# Patient Record
Sex: FEMALE | Race: WHITE | NOT HISPANIC OR LATINO | URBAN - METROPOLITAN AREA
[De-identification: names, ages, dates, MRNs, and addresses within clinical notes are randomized per-mention and may not be internally consistent; named-entity substitution may affect disease eponyms.]

---

## 2022-12-09 ENCOUNTER — OUTPATIENT (OUTPATIENT)
Dept: INPATIENT UNIT | Facility: HOSPITAL | Age: 22
LOS: 1 days | Discharge: HOME | End: 2022-12-09

## 2022-12-09 ENCOUNTER — EMERGENCY (EMERGENCY)
Facility: HOSPITAL | Age: 22
LOS: 1 days | Discharge: STILL ADMITTED OR ADMINISTRATIVE | End: 2022-12-09
Attending: EMERGENCY MEDICINE | Admitting: EMERGENCY MEDICINE
Payer: SELF-PAY

## 2022-12-09 VITALS
OXYGEN SATURATION: 100 % | DIASTOLIC BLOOD PRESSURE: 58 MMHG | TEMPERATURE: 98 F | HEART RATE: 70 BPM | SYSTOLIC BLOOD PRESSURE: 101 MMHG | RESPIRATION RATE: 16 BRPM

## 2022-12-09 VITALS
TEMPERATURE: 99 F | SYSTOLIC BLOOD PRESSURE: 109 MMHG | DIASTOLIC BLOOD PRESSURE: 59 MMHG | RESPIRATION RATE: 17 BRPM | HEART RATE: 78 BPM

## 2022-12-09 DIAGNOSIS — O26.899 OTHER SPECIFIED PREGNANCY RELATED CONDITIONS, UNSPECIFIED TRIMESTER: ICD-10-CM

## 2022-12-09 PROCEDURE — 99285 EMERGENCY DEPT VISIT HI MDM: CPT

## 2022-12-09 NOTE — ED PROVIDER NOTE - ATTENDING CONTRIBUTION TO CARE
Agree with resident note  22-year-old female G4, P3 at approximately 9 months by last menstrual period presents with lower abdominal pain.  Patient is Vietnamese speaking,  used.  Patient states last menstrual period sometime in March.  Since then has not received any prenatal care.  States felt no indication to see an OB/GYN.  States pregnancy has progressed normally as her last 3 have.  Denies vaginal bleeding, uterine contractions.  Physical exam  Gen: pt well appearing in no respiratory distress  vital signs stable  NCAT  Lungs: CTAB/L  Cardiac: s1 s2 no m/r/g  abdomen: gravid uterus  ext: no edema  Neuro: CNs intact 5/5 motor UE and LE; sensation intact; gait stable  skin: no rash  Bedside ultrasound; shows mature fetus likely corresponding to dates  Impression; likely 9-month pregnant patient with abdominal pain; will need to go to labor and delivery urgently, not actively bleeding or having contractions.  Called labor and delivery spoke with midlevel provider who instructed to send patient.  Will dispo as a AOU to L&D.

## 2022-12-09 NOTE — OB RN TRIAGE NOTE - FALL HARM RISK - UNIVERSAL INTERVENTIONS
Bed in lowest position, wheels locked, appropriate side rails in place/Call bell, personal items and telephone in reach/Instruct patient to call for assistance before getting out of bed or chair/Non-slip footwear when patient is out of bed/[place] to call system/Physically safe environment - no spills, clutter or unnecessary equipment/Purposeful Proactive Rounding/Room/bathroom lighting operational, light cord in reach

## 2022-12-09 NOTE — ED ADULT NURSE NOTE - NSIMPLEMENTINTERV_GEN_ALL_ED
Implemented All Universal Safety Interventions:  [place] to call system. Call bell, personal items and telephone within reach. Instruct patient to call for assistance. Room bathroom lighting operational. Non-slip footwear when patient is off stretcher. Physically safe environment: no spills, clutter or unnecessary equipment. Stretcher in lowest position, wheels locked, appropriate side rails in place.

## 2022-12-09 NOTE — ED PROVIDER NOTE - CLINICAL SUMMARY MEDICAL DECISION MAKING FREE TEXT BOX
22-year-old female G4, P3 presenting with a chief complaint lower abdominal pain. States that her last menstrual period was in February and tested positive in March. Patient denies- fevers chills chest pain shortness of breath nausea vomiting.. Vitals - WNL. Physical exam- Gravid abdomen, POCUS large fetus with fetal HR appreciated. Patient to stable and to be transferred to L&D.

## 2022-12-09 NOTE — OB RN TRIAGE NOTE - NS_TRIAGEADDITIONAL COMMENTS_OBGYN_ALL_OB_FT
1910:  received pt waiting in the lobby of labor and delivery, no available triage rooms at the time. 1940: pt c/o that she was unable to wait any longer for a room, pt left without being seen by a provider. pt had no prenatal care this pregnancy, edc unknown. pt was notified of risk, pt insisted on leaving, providers made aware, maría elena np, teodoro moeller,

## 2022-12-09 NOTE — ED ADULT NURSE NOTE - OBJECTIVE STATEMENT
Pt presents to ED ambulatory with steady gait c/o abdominal pain x a few days, worse at night. States she is pregnant but unsure how far along and unsure of lmp. denies nausea, vomiting, diarrhea or other complaints.

## 2022-12-09 NOTE — ED PROVIDER NOTE - PHYSICAL EXAMINATION
GENERAL: Awake, alert, NAD  HEENT: NC/AT, moist mucous membranes, PERRL, EOMI  LUNGS: CTAB, no wheezes or crackles   CARDIAC: RRR, no m/r/g  ABDOMEN: Gravid abdomen, no TTP.   BACK: No midline spinal tenderness, no CVA tenderness  EXT: No edema, no calf tenderness, 2+ DP pulses bilaterally, no deformities.  NEURO: A&Ox3. Moving all extremities.  SKIN: Warm and dry. No rash.  PSYCH: Normal affect.

## 2022-12-09 NOTE — ED PROVIDER NOTE - DISPOSITION TYPE
AOU
no back pain/no chest pain/no shortness of breath/no vomiting/no nausea/no chills/no fever/no abd pain, leg swelling, diarrhea, HA

## 2022-12-09 NOTE — ED PROVIDER NOTE - OBJECTIVE STATEMENT
22-year-old female G4, P3 presenting with a chief complaint lower abdominal pain. Patient is primarily Greenlandic speaking and translation services were used. States that she is pregnant but is unsure of how far along she is.  States that her last menstrual period was in February and tested positive in March. States she has not been to a physician since March because she felt as though everything was progressing normally.  Patient denies any bleeding or vaginal discharge.  States that the pain feels like pressure. Bedside U/S done showing large fetus with heartbeat. Patient is approximately 9 months pregnant.   Patient denies any fevers chills chest pain shortness of breath nausea vomiting.  ROS positive for abdominal pain.

## 2022-12-09 NOTE — OB RN TRIAGE NOTE - NS_OBGYNHISTORY_OBGYN_ALL_OB_FT
17 FT  in [facility] male 3.5kg  18 FT  in [facility] male 3.5kg  22 FT  in [place] male 3.5kg

## 2022-12-12 ENCOUNTER — OUTPATIENT (OUTPATIENT)
Dept: INPATIENT UNIT | Facility: HOSPITAL | Age: 22
LOS: 1 days | Discharge: HOME | End: 2022-12-12

## 2022-12-12 ENCOUNTER — EMERGENCY (EMERGENCY)
Facility: HOSPITAL | Age: 22
LOS: 1 days | Discharge: STILL ADMITTED OR ADMINISTRATIVE | End: 2022-12-12
Admitting: EMERGENCY MEDICINE

## 2022-12-12 VITALS
SYSTOLIC BLOOD PRESSURE: 113 MMHG | RESPIRATION RATE: 18 BRPM | HEART RATE: 69 BPM | DIASTOLIC BLOOD PRESSURE: 66 MMHG | OXYGEN SATURATION: 100 % | TEMPERATURE: 98 F

## 2022-12-12 VITALS
HEART RATE: 82 BPM | OXYGEN SATURATION: 100 % | SYSTOLIC BLOOD PRESSURE: 110 MMHG | DIASTOLIC BLOOD PRESSURE: 54 MMHG | TEMPERATURE: 99 F | RESPIRATION RATE: 16 BRPM

## 2022-12-12 VITALS — DIASTOLIC BLOOD PRESSURE: 67 MMHG | SYSTOLIC BLOOD PRESSURE: 117 MMHG | HEART RATE: 75 BPM

## 2022-12-12 DIAGNOSIS — O26.899 OTHER SPECIFIED PREGNANCY RELATED CONDITIONS, UNSPECIFIED TRIMESTER: ICD-10-CM

## 2022-12-12 LAB
APPEARANCE UR: ABNORMAL
BACTERIA # UR AUTO: ABNORMAL
BASOPHILS # BLD AUTO: 0.03 K/UL — SIGNIFICANT CHANGE UP (ref 0–0.2)
BASOPHILS NFR BLD AUTO: 0.3 % — SIGNIFICANT CHANGE UP (ref 0–2)
BILIRUB UR-MCNC: NEGATIVE — SIGNIFICANT CHANGE UP
BLD GP AB SCN SERPL QL: NEGATIVE — SIGNIFICANT CHANGE UP
COLOR SPEC: YELLOW — SIGNIFICANT CHANGE UP
DIFF PNL FLD: ABNORMAL
EOSINOPHIL # BLD AUTO: 0.15 K/UL — SIGNIFICANT CHANGE UP (ref 0–0.5)
EOSINOPHIL NFR BLD AUTO: 1.6 % — SIGNIFICANT CHANGE UP (ref 0–6)
EPI CELLS # UR: 5 /HPF — SIGNIFICANT CHANGE UP (ref 0–5)
GLUCOSE UR QL: NEGATIVE — SIGNIFICANT CHANGE UP
HBV SURFACE AG SERPL QL IA: SIGNIFICANT CHANGE UP
HCT VFR BLD CALC: 33.9 % — LOW (ref 34.5–45)
HGB BLD-MCNC: 10.5 G/DL — LOW (ref 11.5–15.5)
HIV 1+2 AB+HIV1 P24 AG SERPL QL IA: SIGNIFICANT CHANGE UP
HYALINE CASTS # UR AUTO: 0 /LPF — SIGNIFICANT CHANGE UP (ref 0–7)
IANC: 5.89 K/UL — SIGNIFICANT CHANGE UP (ref 1.8–7.4)
IMM GRANULOCYTES NFR BLD AUTO: 0.3 % — SIGNIFICANT CHANGE UP (ref 0–0.9)
KETONES UR-MCNC: NEGATIVE — SIGNIFICANT CHANGE UP
LEUKOCYTE ESTERASE UR-ACNC: ABNORMAL
LYMPHOCYTES # BLD AUTO: 2.34 K/UL — SIGNIFICANT CHANGE UP (ref 1–3.3)
LYMPHOCYTES # BLD AUTO: 25.6 % — SIGNIFICANT CHANGE UP (ref 13–44)
MCHC RBC-ENTMCNC: 25.5 PG — LOW (ref 27–34)
MCHC RBC-ENTMCNC: 31 GM/DL — LOW (ref 32–36)
MCV RBC AUTO: 82.3 FL — SIGNIFICANT CHANGE UP (ref 80–100)
MONOCYTES # BLD AUTO: 0.71 K/UL — SIGNIFICANT CHANGE UP (ref 0–0.9)
MONOCYTES NFR BLD AUTO: 7.8 % — SIGNIFICANT CHANGE UP (ref 2–14)
NEUTROPHILS # BLD AUTO: 5.89 K/UL — SIGNIFICANT CHANGE UP (ref 1.8–7.4)
NEUTROPHILS NFR BLD AUTO: 64.4 % — SIGNIFICANT CHANGE UP (ref 43–77)
NITRITE UR-MCNC: NEGATIVE — SIGNIFICANT CHANGE UP
NRBC # BLD: 0 /100 WBCS — SIGNIFICANT CHANGE UP (ref 0–0)
NRBC # FLD: 0 K/UL — SIGNIFICANT CHANGE UP (ref 0–0)
PH UR: 6.5 — SIGNIFICANT CHANGE UP (ref 5–8)
PLATELET # BLD AUTO: 268 K/UL — SIGNIFICANT CHANGE UP (ref 150–400)
PROT UR-MCNC: ABNORMAL
RBC # BLD: 4.12 M/UL — SIGNIFICANT CHANGE UP (ref 3.8–5.2)
RBC # FLD: 13.2 % — SIGNIFICANT CHANGE UP (ref 10.3–14.5)
RBC CASTS # UR COMP ASSIST: 2 /HPF — SIGNIFICANT CHANGE UP (ref 0–4)
RH IG SCN BLD-IMP: POSITIVE — SIGNIFICANT CHANGE UP
RUBV IGG SER-ACNC: 4.6 INDEX — SIGNIFICANT CHANGE UP
RUBV IGG SER-IMP: POSITIVE — SIGNIFICANT CHANGE UP
SP GR SPEC: 1.02 — SIGNIFICANT CHANGE UP (ref 1.01–1.05)
T PALLIDUM AB TITR SER: NEGATIVE — SIGNIFICANT CHANGE UP
UROBILINOGEN FLD QL: SIGNIFICANT CHANGE UP
WBC # BLD: 9.15 K/UL — SIGNIFICANT CHANGE UP (ref 3.8–10.5)
WBC # FLD AUTO: 9.15 K/UL — SIGNIFICANT CHANGE UP (ref 3.8–10.5)
WBC UR QL: 246 /HPF — HIGH (ref 0–5)

## 2022-12-12 PROCEDURE — 76818 FETAL BIOPHYS PROFILE W/NST: CPT | Mod: 26

## 2022-12-12 PROCEDURE — 99217: CPT

## 2022-12-12 PROCEDURE — L9996: CPT

## 2022-12-12 RX ORDER — CEPHALEXIN 500 MG
1 CAPSULE ORAL
Qty: 14 | Refills: 0
Start: 2022-12-12 | End: 2022-12-18

## 2022-12-12 NOTE — OB PROVIDER TRIAGE NOTE - PLAN OF CARE
No evidence of PTL   VE 0/0/-3 with irregular contractions- patient denies contractions and cramping.   Fetal status reassuring  Reports that she has had abdominal pain on and off throughout pregnancy and it feels better.   Patient d/w Dr. Hernandez and Dr. Overton, tracing reviewed   Keflex 500mg bid x7days ordered for probable uti   patient to schedule appointment with clinic.   prenatal labs and GBS sent   Patient to be discharged home No evidence of PTL   VE 0/0/-3 with irregular contractions- patient denies feeling contractions or cramping.   Fetal status reassuring  reports her abdominal pain is better.   Plan d/w Dr. Hernandez and Dr. Overton, tracing reviewed   Keflex 500mg bid x7days ordered for probable uti   patient to schedule appointment with clinic phone number given and email sent.   prenatal labs and GBS sent   Patient to be discharged home.    Discharge instructions given using  ID [id number] Kindra

## 2022-12-12 NOTE — OB RN TRIAGE NOTE - NS_OBGYNHISTORY_OBGYN_ALL_OB_FT
17 FT  in [facility] male 3.5kg  18 FT  in [facility] male 3.5kg  22 FT  in [place] male 3.5kg

## 2022-12-12 NOTE — OB RN TRIAGE NOTE - FALL HARM RISK - UNIVERSAL INTERVENTIONS
Bed in lowest position, wheels locked, appropriate side rails in place/Call bell, personal items and telephone in reach/Instruct patient to call for assistance before getting out of bed or chair/Non-slip footwear when patient is out of bed/[place] to call system/Physically safe environment - no spills, clutter or unnecessary equipment/Purposeful Proactive Rounding/Room/bathroom lighting operational, light cord in reach

## 2022-12-12 NOTE — OB PROVIDER TRIAGE NOTE - NSOBPROVIDERNOTE_OBGYN_ALL_OB_FT
Approx. EDC 2/19/2023   reactive NST with irregular contractions on toco. Patient denies feeling contractions at this time.   urine culture sent   awaiting plan of care.  will continue to monitor Approx. EDC 2/19/2023   reactive NST with irregular contractions on toco. Patient denies feeling contractions at this time.   urine culture sent   awaiting plan of care.  will continue to monitor    0330: Plan d/w Dr. Hernandez and Dr. Overton   patient to have GBS and Prenatal labs sent  Patient to start Keflex 500mg BID x7 days for probable uti   patient states her abdominal pain feels better.   To make appointment in clinic- email to be sent Approx. EDC 2/19/2023   reactive NST with irregular contractions on toco. Patient denies feeling contractions at this time.   urine culture sent   awaiting plan of care.  will continue to monitor    0330: Plan d/w Dr. Hernandez and Dr. Overton   patient to have GBS and Prenatal labs sent  Patient to start Keflex 500mg BID x7 days for probable uti   patient states her abdominal pain feels better    0350: Tracing reviewed with Dr. Hernandez and plan discussed  patient approved to be taken off continuous efm at this time   patient to be discharged home.

## 2022-12-12 NOTE — OB PROVIDER TRIAGE NOTE - HISTORY OF PRESENT ILLNESS
22 year old  unaware of EDC or gestational age, reports not having prenatal care presents with complaints of sharp lower abdominal pain for the past few days. Patient denies contractions, leaking of fluid, vaginal bleeding, reports good fetal movement. Patient states that she is from [facility] then was living in [place] where she delivered her last baby in 2022. Patient states she never had a period after her delivery and was unsure when she got pregnant but found out in sept. Patient says that she would go to the hospital for check ups but did not establish care at an OB office. Patient c/o dysuria denies fevers, chills, nausea, vomiting, diarrhea. Reports being treated for a UTI in September.   PMH: Denies   PSH: Denies   Allergies: NKDA  Meds: denies   Denies hx of anxiety, depression, pp depression  denies use of etoh, drugs, tobacco during pregnancy  22 year old  unaware of EDC or gestational age, reports not having prenatal care presents with complaints of sharp lower abdominal pain for the past few days. Patient denies contractions, leaking of fluid, vaginal bleeding, reports good fetal movement. Patient states that she is from [facility] then was living in [place] where she delivered her last baby in 2022. Patient states she never had a period after her delivery and was unsure when she got pregnant but found out in sept. Patient says that she would go to the hospital for check ups but did not establish care at an OB office. Patient c/o dysuria but denies fevers, chills, nausea, vomiting, diarrhea. Reports being treated for a UTI in September.   PMH: Denies   PSH: Denies   Allergies: NKDA  Meds: denies   Denies hx of anxiety, depression, pp depression  denies use of etoh, drugs, tobacco during pregnancy  22 year old  unaware of EDC or gestational age, reports not having prenatal care presents with complaints of sharp lower abdominal pain on and off throughout pregnancy. Patient denies contractions, leaking of fluid, vaginal bleeding, reports good fetal movement. Patient states that she is from [facility] then was living in [place] where she delivered her last baby in 2022. Patient states she never had a period after her delivery and was unsure when she got pregnant but found out in sept. Patient says that she would go to the hospital for check ups but did not establish care at an OB office. Patient c/o dysuria but denies fevers, chills, nausea, vomiting, diarrhea. Reports being treated for a UTI in September.   PMH: Denies   PSH: Denies   Allergies: NKDA  Meds: denies   Denies hx of anxiety, depression, pp depression  denies use of etoh, drugs, tobacco during pregnancy

## 2022-12-12 NOTE — OB PROVIDER TRIAGE NOTE - ADDITIONAL INSTRUCTIONS
Return to hospital for contractions, leaking of fluid, vaginal bleeding, decreased fetal movement.  Fetal kick counts reviewed  Return to hospital for worsening abdominal pain.  Start Keflex 500mg 1 capsule twice daily x7 days   Schedule appointment with [facility] clinic number [phone]

## 2022-12-12 NOTE — OB PROVIDER TRIAGE NOTE - NS_OBGYNHISTORY_OBGYN_ALL_OB_FT
AP course complicated by no prenatal care   OB: 17 FT  in [facility] male 3.5kg         18 FT  in [facility] male 3.5kg        22 FT  in [place] male 3.5kg  GYN: Yenifer

## 2022-12-12 NOTE — ED ADULT TRIAGE NOTE - CHIEF COMPLAINT QUOTE
Pt approx. 9 months pregnant, unknown MIKHAIL, does not have an OB. . C/o persistent lower abd pain x few days. Was seen here  for same complaint, was seen in L&D and d/c. Denies water breaking or vag bleed.     Per MD Brown pt to be seen in L&D

## 2022-12-12 NOTE — OB PROVIDER TRIAGE NOTE - NSHPLABSRESULTS_GEN_ALL_CORE
Urinalysis Basic - ( 12 Dec 2022 02:10 )    Color: Yellow / Appearance: Slightly Turbid / S.020 / pH: x  Gluc: x / Ketone: Negative  / Bili: Negative / Urobili: <2 mg/dL   Blood: x / Protein: 30 mg/dL / Nitrite: Negative   Leuk Esterase: Large / RBC: 2 /HPF /  /HPF   Sq Epi: x / Non Sq Epi: 5 /HPF / Bacteria: Many

## 2022-12-12 NOTE — ED ADULT TRIAGE NOTE - NS ED NURSE NOTE DISPO AOU DETAILS FT
Pt brought to L&D via wheelchair by ED yuli Denton. Pt A&Ox4, ambulatory at baseline. Breathing even and unlabored. NAD

## 2022-12-12 NOTE — OB PROVIDER TRIAGE NOTE - NSHPPHYSICALEXAM_GEN_ALL_CORE
Vital Signs Last 24 Hrs  T(C): 37.0 (12 Dec 2022 01:48), Max: 37 (12 Dec 2022 00:54)  T(F): 98.6 (12 Dec 2022 01:48), Max: 98.6 (12 Dec 2022 00:54)  HR: 77 (12 Dec 2022 02:51) (64 - 82)  BP: 103/55 (12 Dec 2022 02:51) (103/55 - 115/66)  BP(mean): --  RR: 16 (12 Dec 2022 00:54) (16 - 18)  SpO2: 100% (12 Dec 2022 00:54) (100% - 100%)    Assessment reveals VSS  Abdomen soft, NT, gravid   mod. variability with accels no decels, irregular contractions on toco   Transabdominal Ultrasound- bpp 8/8, CHARLENE 10.53, posterior placenta, cephalic, EFW 2283   Vaginal Exam- 0/0/-3  A&Ox3  Lungs- clear bilateral  Heart- normal rate and rhythm Vital Signs Last 24 Hrs  T(C): 37.0 (12 Dec 2022 01:48), Max: 37 (12 Dec 2022 00:54)  T(F): 98.6 (12 Dec 2022 01:48), Max: 98.6 (12 Dec 2022 00:54)  HR: 77 (12 Dec 2022 02:51) (64 - 82)  BP: 103/55 (12 Dec 2022 02:51) (103/55 - 115/66)  BP(mean): --  RR: 16 (12 Dec 2022 00:54) (16 - 18)  SpO2: 100% (12 Dec 2022 00:54) (100% - 100%)    Assessment reveals VSS  Abdomen soft, NT, gravid   mod. variability with accels no decels, irregular contractions on toco   Transabdominal Ultrasound- bpp 8/8, CHARLENE 10.53, posterior placenta, cephalic, EFW 2283g   Vaginal Exam- 0/0/-3  A&Ox3  Lungs- clear bilateral  Heart- normal rate and rhythm  No CVA tenderness noted on palpation

## 2022-12-13 PROBLEM — Z00.00 ENCOUNTER FOR PREVENTIVE HEALTH EXAMINATION: Status: ACTIVE | Noted: 2022-12-13

## 2022-12-13 LAB
CULTURE RESULTS: SIGNIFICANT CHANGE UP
SPECIMEN SOURCE: SIGNIFICANT CHANGE UP

## 2022-12-14 ENCOUNTER — APPOINTMENT (OUTPATIENT)
Dept: OBGYN | Facility: HOSPITAL | Age: 22
End: 2022-12-14

## 2022-12-14 LAB
CULTURE RESULTS: SIGNIFICANT CHANGE UP
SPECIMEN SOURCE: SIGNIFICANT CHANGE UP

## 2022-12-20 DIAGNOSIS — Z03.71 ENCOUNTER FOR SUSPECTED PROBLEM WITH AMNIOTIC CAVITY AND MEMBRANE RULED OUT: ICD-10-CM

## 2022-12-20 DIAGNOSIS — Z02.71 ENCOUNTER FOR DISABILITY DETERMINATION: ICD-10-CM

## 2022-12-22 DIAGNOSIS — O26.893 OTHER SPECIFIED PREGNANCY RELATED CONDITIONS, THIRD TRIMESTER: ICD-10-CM

## 2022-12-22 DIAGNOSIS — O09.33 SUPERVISION OF PREGNANCY WITH INSUFFICIENT ANTENATAL CARE, THIRD TRIMESTER: ICD-10-CM

## 2022-12-22 DIAGNOSIS — Z87.440 PERSONAL HISTORY OF URINARY (TRACT) INFECTIONS: ICD-10-CM

## 2022-12-22 DIAGNOSIS — Z3A.33 33 WEEKS GESTATION OF PREGNANCY: ICD-10-CM

## 2022-12-22 DIAGNOSIS — R10.30 LOWER ABDOMINAL PAIN, UNSPECIFIED: ICD-10-CM

## 2023-01-11 ENCOUNTER — EMERGENCY (EMERGENCY)
Facility: HOSPITAL | Age: 23
LOS: 1 days | Discharge: STILL ADMITTED OR ADMINISTRATIVE | End: 2023-01-11
Admitting: EMERGENCY MEDICINE
Payer: SELF-PAY

## 2023-01-11 ENCOUNTER — INPATIENT (INPATIENT)
Facility: HOSPITAL | Age: 23
LOS: 1 days | Discharge: HOME | End: 2023-01-13
Attending: SPECIALIST | Admitting: SPECIALIST
Payer: SELF-PAY

## 2023-01-11 VITALS — HEART RATE: 90 BPM | DIASTOLIC BLOOD PRESSURE: 72 MMHG | SYSTOLIC BLOOD PRESSURE: 119 MMHG

## 2023-01-11 VITALS
OXYGEN SATURATION: 100 % | RESPIRATION RATE: 16 BRPM | HEART RATE: 88 BPM | DIASTOLIC BLOOD PRESSURE: 64 MMHG | TEMPERATURE: 98 F | SYSTOLIC BLOOD PRESSURE: 117 MMHG

## 2023-01-11 DIAGNOSIS — O26.899 OTHER SPECIFIED PREGNANCY RELATED CONDITIONS, UNSPECIFIED TRIMESTER: ICD-10-CM

## 2023-01-11 DIAGNOSIS — O42.90 PREMATURE RUPTURE OF MEMBRANES, UNSPECIFIED AS TO LENGTH OF TIME BETWEEN RUPTURE AND ONSET OF LABOR, UNSPECIFIED WEEKS OF GESTATION: ICD-10-CM

## 2023-01-11 DIAGNOSIS — O42.10 PREMATURE RUPTURE OF MEMBRANES, ONSET OF LABOR MORE THAN 24 HOURS FOLLOWING RUPTURE, UNSPECIFIED WEEKS OF GESTATION: ICD-10-CM

## 2023-01-11 LAB
BASOPHILS # BLD AUTO: 0.01 K/UL — SIGNIFICANT CHANGE UP (ref 0–0.2)
BASOPHILS NFR BLD AUTO: 0.1 % — SIGNIFICANT CHANGE UP (ref 0–2)
BLD GP AB SCN SERPL QL: NEGATIVE — SIGNIFICANT CHANGE UP
COVID-19 SPIKE DOMAIN AB INTERP: POSITIVE
COVID-19 SPIKE DOMAIN ANTIBODY RESULT: >250 U/ML — HIGH
EOSINOPHIL # BLD AUTO: 0.11 K/UL — SIGNIFICANT CHANGE UP (ref 0–0.5)
EOSINOPHIL NFR BLD AUTO: 0.9 % — SIGNIFICANT CHANGE UP (ref 0–6)
HCT VFR BLD CALC: 33.2 % — LOW (ref 34.5–45)
HGB BLD-MCNC: 10.5 G/DL — LOW (ref 11.5–15.5)
IANC: 8.15 K/UL — HIGH (ref 1.8–7.4)
IMM GRANULOCYTES NFR BLD AUTO: 0.3 % — SIGNIFICANT CHANGE UP (ref 0–0.9)
LYMPHOCYTES # BLD AUTO: 2.67 K/UL — SIGNIFICANT CHANGE UP (ref 1–3.3)
LYMPHOCYTES # BLD AUTO: 22.6 % — SIGNIFICANT CHANGE UP (ref 13–44)
MCHC RBC-ENTMCNC: 25.2 PG — LOW (ref 27–34)
MCHC RBC-ENTMCNC: 31.6 GM/DL — LOW (ref 32–36)
MCV RBC AUTO: 79.6 FL — LOW (ref 80–100)
MONOCYTES # BLD AUTO: 0.82 K/UL — SIGNIFICANT CHANGE UP (ref 0–0.9)
MONOCYTES NFR BLD AUTO: 6.9 % — SIGNIFICANT CHANGE UP (ref 2–14)
NEUTROPHILS # BLD AUTO: 8.15 K/UL — HIGH (ref 1.8–7.4)
NEUTROPHILS NFR BLD AUTO: 69.2 % — SIGNIFICANT CHANGE UP (ref 43–77)
NRBC # BLD: 0 /100 WBCS — SIGNIFICANT CHANGE UP (ref 0–0)
NRBC # FLD: 0 K/UL — SIGNIFICANT CHANGE UP (ref 0–0)
PLATELET # BLD AUTO: 230 K/UL — SIGNIFICANT CHANGE UP (ref 150–400)
RBC # BLD: 4.17 M/UL — SIGNIFICANT CHANGE UP (ref 3.8–5.2)
RBC # FLD: 14.3 % — SIGNIFICANT CHANGE UP (ref 10.3–14.5)
RH IG SCN BLD-IMP: POSITIVE — SIGNIFICANT CHANGE UP
SARS-COV-2 IGG+IGM SERPL QL IA: >250 U/ML — HIGH
SARS-COV-2 IGG+IGM SERPL QL IA: POSITIVE
SARS-COV-2 RNA SPEC QL NAA+PROBE: SIGNIFICANT CHANGE UP
T PALLIDUM AB TITR SER: NEGATIVE — SIGNIFICANT CHANGE UP
WBC # BLD: 11.8 K/UL — HIGH (ref 3.8–10.5)
WBC # FLD AUTO: 11.8 K/UL — HIGH (ref 3.8–10.5)

## 2023-01-11 PROCEDURE — L9996: CPT

## 2023-01-11 PROCEDURE — 59409 OBSTETRICAL CARE: CPT | Mod: AS,U8

## 2023-01-11 RX ORDER — AER TRAVELER 0.5 G/1
1 SOLUTION RECTAL; TOPICAL EVERY 4 HOURS
Refills: 0 | Status: DISCONTINUED | OUTPATIENT
Start: 2023-01-11 | End: 2023-01-13

## 2023-01-11 RX ORDER — OXYTOCIN 10 UNIT/ML
333.33 VIAL (ML) INJECTION
Qty: 20 | Refills: 0 | Status: DISCONTINUED | OUTPATIENT
Start: 2023-01-11 | End: 2023-01-11

## 2023-01-11 RX ORDER — BENZOCAINE 10 %
1 GEL (GRAM) MUCOUS MEMBRANE EVERY 6 HOURS
Refills: 0 | Status: DISCONTINUED | OUTPATIENT
Start: 2023-01-11 | End: 2023-01-13

## 2023-01-11 RX ORDER — DIPHENHYDRAMINE HCL 50 MG
25 CAPSULE ORAL EVERY 6 HOURS
Refills: 0 | Status: DISCONTINUED | OUTPATIENT
Start: 2023-01-11 | End: 2023-01-13

## 2023-01-11 RX ORDER — PRAMOXINE HYDROCHLORIDE 150 MG/15G
1 AEROSOL, FOAM RECTAL EVERY 4 HOURS
Refills: 0 | Status: DISCONTINUED | OUTPATIENT
Start: 2023-01-11 | End: 2023-01-13

## 2023-01-11 RX ORDER — SODIUM CHLORIDE 9 MG/ML
3 INJECTION INTRAMUSCULAR; INTRAVENOUS; SUBCUTANEOUS EVERY 8 HOURS
Refills: 0 | Status: DISCONTINUED | OUTPATIENT
Start: 2023-01-11 | End: 2023-01-13

## 2023-01-11 RX ORDER — LANOLIN
1 OINTMENT (GRAM) TOPICAL EVERY 6 HOURS
Refills: 0 | Status: DISCONTINUED | OUTPATIENT
Start: 2023-01-11 | End: 2023-01-13

## 2023-01-11 RX ORDER — ACETAMINOPHEN 500 MG
975 TABLET ORAL
Refills: 0 | Status: DISCONTINUED | OUTPATIENT
Start: 2023-01-11 | End: 2023-01-13

## 2023-01-11 RX ORDER — KETOROLAC TROMETHAMINE 30 MG/ML
30 SYRINGE (ML) INJECTION ONCE
Refills: 0 | Status: DISCONTINUED | OUTPATIENT
Start: 2023-01-11 | End: 2023-01-11

## 2023-01-11 RX ORDER — MAGNESIUM HYDROXIDE 400 MG/1
30 TABLET, CHEWABLE ORAL
Refills: 0 | Status: DISCONTINUED | OUTPATIENT
Start: 2023-01-11 | End: 2023-01-13

## 2023-01-11 RX ORDER — DIBUCAINE 1 %
1 OINTMENT (GRAM) RECTAL EVERY 6 HOURS
Refills: 0 | Status: DISCONTINUED | OUTPATIENT
Start: 2023-01-11 | End: 2023-01-13

## 2023-01-11 RX ORDER — SIMETHICONE 80 MG/1
80 TABLET, CHEWABLE ORAL EVERY 4 HOURS
Refills: 0 | Status: DISCONTINUED | OUTPATIENT
Start: 2023-01-11 | End: 2023-01-13

## 2023-01-11 RX ORDER — AMPICILLIN TRIHYDRATE 250 MG
1 CAPSULE ORAL EVERY 4 HOURS
Refills: 0 | Status: DISCONTINUED | OUTPATIENT
Start: 2023-01-11 | End: 2023-01-11

## 2023-01-11 RX ORDER — OXYTOCIN 10 UNIT/ML
41.67 VIAL (ML) INJECTION
Qty: 20 | Refills: 0 | Status: DISCONTINUED | OUTPATIENT
Start: 2023-01-11 | End: 2023-01-12

## 2023-01-11 RX ORDER — IBUPROFEN 200 MG
600 TABLET ORAL EVERY 6 HOURS
Refills: 0 | Status: COMPLETED | OUTPATIENT
Start: 2023-01-11 | End: 2023-12-10

## 2023-01-11 RX ORDER — AMPICILLIN TRIHYDRATE 250 MG
2 CAPSULE ORAL ONCE
Refills: 0 | Status: COMPLETED | OUTPATIENT
Start: 2023-01-11 | End: 2023-01-11

## 2023-01-11 RX ORDER — SODIUM CHLORIDE 9 MG/ML
1000 INJECTION, SOLUTION INTRAVENOUS ONCE
Refills: 0 | Status: COMPLETED | OUTPATIENT
Start: 2023-01-11 | End: 2023-01-11

## 2023-01-11 RX ORDER — OXYTOCIN 10 UNIT/ML
2 VIAL (ML) INJECTION
Qty: 30 | Refills: 0 | Status: DISCONTINUED | OUTPATIENT
Start: 2023-01-11 | End: 2023-01-11

## 2023-01-11 RX ORDER — SODIUM CHLORIDE 9 MG/ML
1000 INJECTION, SOLUTION INTRAVENOUS
Refills: 0 | Status: DISCONTINUED | OUTPATIENT
Start: 2023-01-11 | End: 2023-01-11

## 2023-01-11 RX ORDER — HYDROCORTISONE 1 %
1 OINTMENT (GRAM) TOPICAL EVERY 6 HOURS
Refills: 0 | Status: DISCONTINUED | OUTPATIENT
Start: 2023-01-11 | End: 2023-01-13

## 2023-01-11 RX ORDER — CHLORHEXIDINE GLUCONATE 213 G/1000ML
1 SOLUTION TOPICAL ONCE
Refills: 0 | Status: DISCONTINUED | OUTPATIENT
Start: 2023-01-11 | End: 2023-01-11

## 2023-01-11 RX ORDER — TETANUS TOXOID, REDUCED DIPHTHERIA TOXOID AND ACELLULAR PERTUSSIS VACCINE, ADSORBED 5; 2.5; 8; 8; 2.5 [IU]/.5ML; [IU]/.5ML; UG/.5ML; UG/.5ML; UG/.5ML
0.5 SUSPENSION INTRAMUSCULAR ONCE
Refills: 0 | Status: DISCONTINUED | OUTPATIENT
Start: 2023-01-11 | End: 2023-01-13

## 2023-01-11 RX ORDER — INFLUENZA VIRUS VACCINE 15; 15; 15; 15 UG/.5ML; UG/.5ML; UG/.5ML; UG/.5ML
0.5 SUSPENSION INTRAMUSCULAR ONCE
Refills: 0 | Status: DISCONTINUED | OUTPATIENT
Start: 2023-01-11 | End: 2023-01-13

## 2023-01-11 RX ORDER — IBUPROFEN 200 MG
600 TABLET ORAL EVERY 6 HOURS
Refills: 0 | Status: DISCONTINUED | OUTPATIENT
Start: 2023-01-11 | End: 2023-01-13

## 2023-01-11 RX ADMIN — Medication 108 GRAM(S): at 10:19

## 2023-01-11 RX ADMIN — Medication 125 MILLIUNIT(S)/MIN: at 11:16

## 2023-01-11 RX ADMIN — SODIUM CHLORIDE 125 MILLILITER(S): 9 INJECTION, SOLUTION INTRAVENOUS at 08:05

## 2023-01-11 RX ADMIN — Medication 975 MILLIGRAM(S): at 21:00

## 2023-01-11 RX ADMIN — Medication 200 GRAM(S): at 06:20

## 2023-01-11 RX ADMIN — Medication 975 MILLIGRAM(S): at 20:24

## 2023-01-11 RX ADMIN — SODIUM CHLORIDE 3 MILLILITER(S): 9 INJECTION INTRAMUSCULAR; INTRAVENOUS; SUBCUTANEOUS at 22:06

## 2023-01-11 RX ADMIN — SODIUM CHLORIDE 3 MILLILITER(S): 9 INJECTION INTRAMUSCULAR; INTRAVENOUS; SUBCUTANEOUS at 16:00

## 2023-01-11 RX ADMIN — SODIUM CHLORIDE 2000 MILLILITER(S): 9 INJECTION, SOLUTION INTRAVENOUS at 06:44

## 2023-01-11 NOTE — OB PROVIDER H&P - HISTORY OF PRESENT ILLNESS
23 yo no PNC, German speaking  # [id number],  unknown EDC, pt states she is around 9 months,  c/o LOF since 1am and contractions q 5minutes. seen here 22 for r/o labor as pt reported 9 months, according to triage note  bedside sono estimated EDC 23 and was @ 33 wks on , GBS sent on . AP  course complicated by no PNC, +GBS. denies fever chills ha n/v new swelling vision changes epigastric pain cp sob or cough. pt reports did not need prenatal care during pregnancy as per previous chart pt delivered 2022 in [place] vaginally and never had a period after delivery     GBS: + from 22  meds: denies  all: denies  PMH: denies  PSH: denies  gyn hx:denies  ob hx:   [facility] uncomplicated 2017 male   [facility] uncomplicated 2018 male   [place] 2022 uncomplicated 2022 male 21 yo no PNC, Malagasy speaking  # [id number],  unknown EDC, pt states she is around 9 months,  c/o LOF since 1am and contractions q 5minutes. seen here 22 for r/o labor as pt reported 9 months, according to triage note  bedside sono estimated EDC 1 or 23? and was @ 33 wks on , GBS sent on . AP  course complicated by no PNC, +GBS. denies fever chills ha n/v new swelling vision changes epigastric pain cp sob or cough. pt reports did not need prenatal care during pregnancy as per previous chart pt delivered 2022 in [place] vaginally and never had a period after delivery     GBS: + from 22  meds: denies  all: denies  PMH: denies  PSH: denies  gyn hx:denies  ob hx:   [facility] uncomplicated 2017 male   [facility] uncomplicated 2018 male   [place] 2022 uncomplicated 2022 male

## 2023-01-11 NOTE — OB RN DELIVERY SUMMARY - NSSELHIDDEN_OBGYN_ALL_OB_FT
SUBJECTIVE Chief Complaint Patient presents with  Cardiomyopathy  Fall 10- seen at [facility]   
 Hip Pain  Hypertension  Immunization/Injection Patient presents for follow-up. Had a recent fall last week when he had his ankle brace off and lost balance. He fell onto his left hip and sustained a bruise. He has taken Advil once with minimal relief. Went to [facility] and had negative x-rays of the hip by report. Pain is manageable and he is walking comfortably. Bruise is just painful. He has used ice initially and now heat. Has been keeping up with vascular and cardiology. Has been checking INR at home as he is on coumadin. He has had a history of a GI bleed and a DVT/PE. He had profound anemia and hypokalemia but those have resolved based on follow-up labs. No increase in fatigued or dyspnea. No longer with GI bleeding or signs of that. No dizziness or lightheadedness. No chest pain. ROS: 
History obtained from the patient · Respiratory: no cough, shortness of breath, or wheezing · Cardiovascular: no chest pain, palpitations, or dyspnea on exertion · Gastrointestinal: no abdominal pain, N/V, or black or bloody stool. · Neurological: no numbness, tingling, headache or dizziness. No memory loss. OBJECTIVE Blood pressure 118/72, pulse 73, temperature 98.3 °F (36.8 °C), temperature source Oral, resp. rate 16, height 5' 10\" (1.778 m), weight 218 lb (98.9 kg), SpO2 97 %. General:  Alert, cooperative, well appearing, in no apparent distress. ENT:  The tongue and mucous membranes are pink and moist without lesions. CV:  The heart sounds are regular in rate and rhythm. There is a normal S1 and S2.   
Lungs: Inspiratory and expiratory efforts are full and unlabored. Lung sounds are clear and equal to auscultation throughout all lung fields without wheezing, rales, or rhonchi. Abd: soft, nontender, nondistended. No HSM. No r/g. Ext: no swelling or tenderness. Gait is normal.   
Skin:  No rashes, no jaundice. Large lateral thigh bruise and buttocks bruise on the left LE. There is no induration or fluctuance. It is superficial but covers a large area. ASSESSMENT / PLAN 
  ICD-10-CM ICD-9-CM 1. Essential hypertension I10 401.9 CBC WITH AUTOMATED DIFF  
   METABOLIC PANEL, COMPREHENSIVE  
   LIPID PANEL 2. Cardiomyopathy, unspecified type [facility] I42.9 425.4 3. Atrial flutter with rapid ventricular response (HCC) I48.92 427.32   
4. S/P ablation of atrial flutter Z98.890 V45.89   
 Z86.79    
5. 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 Other pulmonary embolism without acute cor pulmonale, unspecified chronicity ([facility]) I26.99 415.19   
7. Hypokalemia E87.6 276.8 8. Obesity with serious comorbidity, unspecified classification, unspecified obesity type E66.9 278.00   
9. Hematoma of left hip, initial encounter S70. 02XA 924.01   
10. Encounter for immunization Z23 V03.89 INFLUENZA VACCINE INACTIVATED (IIV), SUBUNIT, ADJUVANTED, IM  
   ADMIN INFLUENZA VIRUS VAC  
 
HTN - controlled. Cont current care. Cardiomyopathy - History of biatrial enlargement and transient cardiomyopathy in 04/2010 likely due to atrial flutter and rapid response. His last echocardiogram was 2011 with normal function. Notes reviewed. Paroxysmal atrial flutter s/p ablation 01/2013 - without clinical recurrence. He is on beta-blocker. He sees cardiology. Notes reviewed. History of popliteal aneurysm status post stenting - cont vascular follow-up. Reviewed notes. PE / DVT on anticoagulation - cont anticoagulation. He will watch closely for signs of bleeding. Cont per coumadin clinic.  Goal INR between 2-3 and stable.  
  
Hypokalemia - he is off potassium and levels have stayed normal.  Recheck in 6 months. Obesity - diet and exercise as tolerated. Hematoma of left hip - monitor to resolution. Avoid NSAIDs.   Transition to tylenol. Fu vaccine administered. All chart history elements were reviewed by me at the time of the visit even though marked at time of note closure. Patient understands our medical plan. Patient has provided input and agrees with goals. Alternatives have been explained and offered. All questions answered. The patient is to call if condition worsens or fails to improve. Follow-up and Dispositions · Return in about 6 months (around 4/22/2020) for  annual medicare wellness-routine care and fasting labs to be done 3-5 days prior . [[email or web address]_ALL_OB_FT:[id number]],[NS_DeliveryAssist1_OBGYN_ALL_OB_FT:[id number]]

## 2023-01-11 NOTE — OB PROVIDER H&P - NS ATTEND AMEND GEN_ALL_CORE FT
Attending Note     Agree with above    with insufficient prenatal care at 37? weeks presentation labor   previously seen here in triage and GBS+   Will admit   pcn for GBS +   R Govind MATHIAS

## 2023-01-11 NOTE — OB PROVIDER H&P - NS_OBGYNHISTORY_OBGYN_ALL_OB_FT
no
gyn hx:denies  ob hx:   [facility] uncomplicated 2017 male   [place] uncomplicated 2018 male   [place] 2022 uncomplicated 2022 male

## 2023-01-11 NOTE — OB PROVIDER DELIVERY SUMMARY - NSPROVIDERDELIVERYNOTE_OBGYN_ALL_OB_FT
Peds called to delivery for unknown gestational age. Spontaneous vaginal delivery of liveborn male infant from OA position. Head, shoulders, and body delivered easily. Infant was suctioned. No mec. Delayed cord clamping and infant was passed to mother. Cord clamped and cut. Placenta delivered intact with a 3 vessel cord. Fundal massage was given and uterine fundus was found to be firm. Vaginal exam revealed an intact cervix, vaginal walls and sulci. Patient had a 1st degree laceration in the perineum that was repaired with 2-0 chromic suture. Excellent hemostasis was noted. Patient was stable and went to recovery. Count was correct x 2.

## 2023-01-11 NOTE — OB PROVIDER DELIVERY SUMMARY - NSSELHIDDEN_OBGYN_ALL_OB_FT
[NS_DeliveryAttending1_OBGYN_ALL_OB_FT:[id number]],[NS_DeliveryAssist1_OBGYN_ALL_OB_FT:[id number]]

## 2023-01-11 NOTE — ED ADULT TRIAGE NOTE - CHIEF COMPLAINT QUOTE
pt coming in for labor check. pt states she is 9 months pregnant unknown when her due date is and not under any OBGYN care. pt states contractions are 10 min apart. pt is from [place]. No complaints of chest pain, headache, nausea, dizziness, vomiting  SOB, fever, chills verbalized..

## 2023-01-11 NOTE — OB RN TRIAGE NOTE - NS_CONTACTNUMBEROFSUPPORTPERSON_OBGYN_ALL_OB_FT
Chief Complaints and History of Present Illnesses   Patient presents with     Follow Up For     Nonexudative senile macular degeneration of retina     HPI    Last Eye Exam:  12/13/16   Affected eye(s):  Both   Symptoms:        Unknown duration    Frequency:  Constant       Do you have eye pain now?:  No      Comments:  He is here today for a follow up of Nonexudative senile macular degeneration of retina  He is also here for an OCT OU.  He feels his vision is not quite as good as last visit.  He has questions about an eye drop he has purchased and an eye cover he wears to bed at night     Christo Liriano COT 8:23 AM July 11, 2017                 [phone]

## 2023-01-11 NOTE — OB PROVIDER TRIAGE NOTE - NSOBPROVIDERNOTE_OBGYN_ALL_OB_FT
23 yo no PNC, Mauritian speaking  # [id number],  unknown EDC, pt states she is around 9 months,  c/o LOF since 1am and contractions q 5minutes. seen here 22 for r/o labor as pt reported 9 months, according to triage note  bedside sono estimated EDC 23 and was @ 33 wks on , GBS sent on . AP  course complicated by no PNC, +GBS. denies fever chills ha n/v new swelling vision changes epigastric pain cp sob or cough. pt reports did not need prenatal care during pregnancy as per previous chart pt delivered 2022 in [place] vaginally and never had a period after delivery     GBS: + from 22  meds: denies  all: denies  PMH: denies  PSH: denies  gyn hx:denies  ob hx:   [facility] uncomplicated 2017 male   [facility] uncomplicated 2018 male   [place] 2022 uncomplicated 2022 male    d/w Dr Corona Castro admit for PROM and labor @ @ 37 wks?  epidural for pain control  Ampicillin for +GBS  prenatals in [facility]  covid swab sent  repeat VE after epidural

## 2023-01-11 NOTE — OB PROVIDER TRIAGE NOTE - NSHPPHYSICALEXAM_GEN_ALL_CORE
abd soft gravid NT  CV RRR  LS clear bilaterally  TAS: vertex  anterior placenta  SVE: 4/70/-3  SSE: + nitrazine +fern  FHT: moderate variability, + accelerations, + decelerations  toco: q 5-6 minutes  Vital Signs Last 24 Hrs  T(C): 36.6 (11 Jan 2023 05:39), Max: 36.7 (11 Jan 2023 04:46)  T(F): 97.9 (11 Jan 2023 05:39), Max: 98 (11 Jan 2023 04:46)  HR: 90 (11 Jan 2023 05:39) (88 - 90)  BP: 119/72 (11 Jan 2023 05:39) (117/64 - 119/72)  BP(mean): --  RR: 15 (11 Jan 2023 05:39) (15 - 16)  SpO2: 100% (11 Jan 2023 04:46) (100% - 100%)    Parameters below as of 11 Jan 2023 06:04  Patient On (Oxygen Delivery Method): room air

## 2023-01-11 NOTE — OB PROVIDER H&P - ASSESSMENT
21 yo no PNC, Citizen of [place] speaking  # [id number],  unknown EDC, pt states she is around 9 months,  c/o LOF since 1am and contractions q 5minutes. seen here 22 for r/o labor as pt reported 9 months, according to triage note  bedside sono estimated EDC 23 and was @ 33 wks on , GBS sent on . AP  course complicated by no PNC, +GBS. denies fever chills ha n/v new swelling vision changes epigastric pain cp sob or cough. pt reports did not need prenatal care during pregnancy as per previous chart pt delivered 2022 in [place] vaginally and never had a period after delivery     GBS: + from 22  meds: denies  all: denies  PMH: denies  PSH: denies  gyn hx:denies  ob hx:   [facility] uncomplicated 2017 male   [facility] uncomplicated 2018 male   [place] 2022 uncomplicated 2022 male    d/w Dr Corona Castro admit for PROM and labor @ @ 37 wks?  epidural for pain control  Ampicillin for +GBS  prenatals in [facility]  covid swab sent  repeat VE after epidural 21 yo no PNC, Surinamese speaking  # [id number],  unknown EDC, pt states she is around 9 months,  c/o LOF since 1am and contractions q 5minutes. seen here 22 for r/o labor as pt reported 9 months, according to triage note  bedside sono estimated EDC1 or 23? and was @ 33 wks on , GBS sent on . AP  course complicated by no PNC, +GBS. denies fever chills ha n/v new swelling vision changes epigastric pain cp sob or cough. pt reports did not need prenatal care during pregnancy as per previous chart pt delivered 2022 in [place] vaginally and never had a period after delivery     GBS: + from 22  meds: denies  all: denies  PMH: denies  PSH: denies  gyn hx:denies  ob hx:   [facility] uncomplicated 2017 male   [facility] uncomplicated 2018 male   [place] 2022 uncomplicated 2022 male    d/w Dr Corona Castro admit for PROM and labor @ @ 37 wks?  epidural for pain control  Ampicillin for +GBS  prenatals in [facility]  covid swab sent  repeat VE after epidural

## 2023-01-11 NOTE — OB PROVIDER LABOR PROGRESS NOTE - ASSESSMENT
22 y.o  with unknown EDC admitted in labor/PTL?, GBS+    -patient making cervical change  -CBC resulted, wnl  -anesthesia team called  -continue Amp  -continue efm/toco    d/w Dr. Faust, service attending  Andria Stephenson NP

## 2023-01-11 NOTE — OB NEONATOLOGY/PEDIATRICIAN DELIVERY SUMMARY - NSPEDSNEONOTESA_OBGYN_ALL_OB_FT
Peds called to LDR for category II tracing. ?37 wk ?LGA male born via  to a 21 y/o  mother.  Maternal history of  x3. Mom is from [facility] and did not receive prenatal care. Was seen here on  ROL and thought to be 33 weeks by bedside US. Maternal labs include Blood Type O+ , HIV - , RPR NR, Rubella I, Hep B -, GBS + on  (received amp x2), COVID -. SROM at 0100 on  with clear fluids (ROM hours: 10H).  Baby emerged vigorous, crying, was w/d/s/s with APGARS of 8/9. Mom plans to initiate breastfeeding, consents Hep B vaccine and consents circ.  Highest maternal temp: 36.7. EOS 0.09. Peds called to LDR for category II tracing. ?37 wk ?LGA male born via  to a 21 y/o  mother.  Maternal history of  x3. Mom is from [facility] and did not receive prenatal care. Was seen here on  ROL and thought to be 33 weeks by bedside US. Maternal labs include Blood Type O+ , HIV - , RPR NR, Rubella I, Hep B -, GBS + on  (received amp x2), COVID -. SROM at 0100 on  with clear fluids (ROM hours: 10H).  Baby emerged vigorous, crying, was w/d/s/s with APGARS of 8/9. Mom plans to initiate breastfeeding, consents Hep B vaccine and consents circ.  Highest maternal temp: 36.7. EOS 0.09.    :   TOB: 1056  Weight: 3320    Physical Exam:  Gen: no acute distress, +grimace  HEENT:  anterior fontanel open soft and flat, nondysmorphic facies, no cleft lip/palate, ears normal set, no ear pits or tags, nares clinically patent  Resp: Normal respiratory effort without grunting or retractions, good air entry b/l, clear to auscultation bilaterally  Cardio: Present S1/S2, regular rate and rhythm, no murmurs  Abd: soft, non tender, non distended, umbilical cord with 3 vessels  Neuro: +palmar and plantar grasp, +suck, +marlys, normal tone  Extremities: negative rodriguez and ortolani maneuvers, moving all extremities, no clavicular crepitus or stepoff  Skin: pink, warm  Genitals: Normal male anatomy, Abner 1, anus patent

## 2023-01-11 NOTE — OB RN DELIVERY SUMMARY - NS_ADMSROM_OBGYN_ALL_OB_DT
CONSTITUTIONAL:  No fever, chills or bodyaches  HEENT:  No nasal congestion, sore throat or headache  PULM:  No cough or shortness of breath  CARD: No chest pain or palpitations  GI:  No diarrhea or vomiting 11-Jan-2023 01:00

## 2023-01-11 NOTE — ED ADULT TRIAGE NOTE - BP NONINVASIVE SYSTOLIC (MM HG)
117 CHIEF COMPLAINT:    Chief Complaint   Patient presents with   • Bee Sting     Stung on the neck by a bee today.  Took advil.       HISTORY OF PRESENT ILLNESS:  Nicole Hamilton is a 70 year old female who presents to the Urgent Care department after being stung by a bee on her neck approximately 3 hours ago while she was riding a bike.  The patient believes that she was stung multiple times before being able to stop the bike and get the be off her.  She did apply a baking powder paste to the area.  She also took ibuprofen.  She tells me that it was more red and swollen but has since improved.  She never experienced any swallowing or breathing difficulty.  She never had the sensation that her tongue was swelling.  She has not taken Benadryl.  She tells me she has a history of allergic reactions to venom in the past and wanted to make sure nothing else needed to be done.    MEDICATIONS:  Current Outpatient Prescriptions   Medication Sig   • Multiple Vitamins-Minerals (MULTIVITAL PO)    • hydrochlorothiazide (HYDRODIURIL) 50 MG tablet Take 1 tablet by mouth daily.   • carvedilol (COREG) 6.25 MG tablet Take 1 tablet by mouth daily.   • rosuvastatin (CRESTOR) 5 MG tablet Take 1 tablet by mouth daily. Change from simvastatin due to side effects   • amlodipine-benazepril (LOTREL) 10-20 MG per capsule Take 1 capsule by mouth daily.   • celecoxib (CELEBREX) 200 MG capsule Take 1 capsule by mouth 2 times daily. PRN   • anastrozole (ARIMIDEX) 1 MG tablet Take 1 tablet by mouth daily.   • potassium chloride (K-DUR,KLOR-CON) 20 MEQ CR tablet Take 2 tablets daily   • MAGNESIUM OXIDE PO Take 1,000 Units by mouth.    • aspirin 81 MG tablet Take 81 mg by mouth daily.   • cholecalciferol (VITAMIN D3) 1000 UNITS tablet Take 1,000 Units by mouth daily.   • loratadine (CLARITIN) 10 MG tablet Take 10 mg by mouth daily.   • pseudoephedrine (SUDAFED) 30 MG tablet Take 30 mg by mouth every 4 hours as needed for Congestion.   • diclofenac  epolamine (FLECTOR) 1.3 % Apply q 12 hours (Patient taking differently: as needed. Apply q 12 hours)     No current facility-administered medications for this visit.        ALLERGIES:  Allergies as of 10/18/2017 - Reviewed 10/18/2017   Allergen Reaction Noted   • Aleve RASH    • Bactrim RASH and PRURITUS    • Toradol RASH    • Tylenol [acetaminophen]         Vitals:    10/18/17 1829   BP: 119/65   Pulse: 78   Resp: 14   Temp: 98.5 °F (36.9 °C)   TempSrc: Oral   SpO2: 95%   Weight: 82.8 kg   Height: 5' 3\" (1.6 m)       EXAM:  General:  The patient is a pleasant woman, who appears in no distress.  Appears stated age.  Normal affect.  Oriented to time and place.  Answers questions appropriately.   Skin:  Focal examination of the area of the sting reveals 1 erythematous region that measures approximately 1-1-1/2 cm in diameter.  No swelling.  Oral:  Oral pharyngeal area was unremarkable.  No edema or mucosal swelling was visible    ASSESSMENT:  1. Bee sting, undetermined intent, initial encounter          PLAN:  The patient was reassured nothing additional needs to be done however she was given 50 mg of Benadryl before leaving the clinic.  She was also advised to have Benadryl on hand and take it as soon as possible after her future stings.  Sedating effects of Benadryl were reviewed with her.  The importance of not scratching the area was emphasized to her.

## 2023-01-11 NOTE — OB PROVIDER LABOR PROGRESS NOTE - ASSESSMENT
PROM, no cervical change, comfortable, overall reassuring status  Start Pitocin augmentation  Reposition  Reassess in 2 hrs/prn  SCOTT Stein

## 2023-01-11 NOTE — OB RN TRIAGE NOTE - NSICDXPASTMEDICALHX_GEN_ALL_CORE_FT
PAST MEDICAL HISTORY:   (normal spontaneous vaginal delivery)      (normal spontaneous vaginal delivery)      (normal spontaneous vaginal delivery)

## 2023-01-11 NOTE — OB PROVIDER H&P - PROBLEM SELECTOR PLAN 1
d/w Dr Corona Castro admit for PROM and labor @ @ 37 wks?  epidural for pain control  Ampicillin for +GBS  prenatals in [facility]  covid swab sent  repeat VE after epidural

## 2023-01-11 NOTE — OB PROVIDER TRIAGE NOTE - HISTORY OF PRESENT ILLNESS
21 yo no PNC, [place]arag[place] speaking  # [id number],  unknown EDC, pt states she is around 9 months,  c/o LOF since 1am and contractions q 5minutes. seen here 22 for r/o labor as pt reported 9 months, according to triage note  bedside sono estimated EDC 23 and was @ 33 wks on , GBS sent on . AP  course complicated by no PNC, +GBS. denies fever chills ha n/v new swelling vision changes epigastric pain cp sob or cough. pt reports did not need prenatal care during pregnancy as per previous chart pt delivered 2022 in [place] vaginally and never had a period after delivery     GBS: + from 22  meds: denies  all: denies  PMH: denies  PSH: denies  gyn hx:denies  ob hx:   [facility] uncomplicated 2017 male   [facility] uncomplicated 2018 male   [place] 2022 uncomplicated 2022 male

## 2023-01-11 NOTE — OB PROVIDER TRIAGE NOTE - NS_OBGYNHISTORY_OBGYN_ALL_OB_FT
gyn hx:denies  ob hx:   [facility] uncomplicated 2017 male   [place] uncomplicated 2018 male   [place] 2022 uncomplicated 2022 male

## 2023-01-11 NOTE — OB PROVIDER LABOR PROGRESS NOTE - NS_SUBJECTIVE/OBJECTIVE_OBGYN_ALL_OB_FT
Pt seen and examined for labor progress, comfortable with epidural  VSS  Cat 2 , moderate variability with mild variable decelerations  [place]: q5-7  VE: 5-6/80/-2
Language Line Scottish  Durga, ID# [id number]    Patient seen and examined to reassess cervical dilation. Patient c/o increased contraction pain, but denies rectal pressure or urge to push at this time. Ampicillin given for +GBS. Awaiting CBC results prior to epidural administration.    Vital Signs Last 24 Hrs  T(C): 36.7 (11 Jan 2023 06:04), Max: 36.7 (11 Jan 2023 04:46)  T(F): 98.1 (11 Jan 2023 06:04), Max: 98.1 (11 Jan 2023 06:04)  HR: 93 (11 Jan 2023 06:53) (81 - 96)  BP: 100/51 (11 Jan 2023 06:33) (100/51 - 119/72)  RR: 20 (11 Jan 2023 06:04) (15 - 20)  SpO2: 90% (11 Jan 2023 06:53) (90% - 100%)    Parameters below as of 11 Jan 2023 06:04  Patient On (Oxygen Delivery Method): room air

## 2023-01-12 ENCOUNTER — TRANSCRIPTION ENCOUNTER (OUTPATIENT)
Age: 23
End: 2023-01-12

## 2023-01-12 LAB
HCT VFR BLD CALC: 28.8 % — LOW (ref 34.5–45)
HGB BLD-MCNC: 9 G/DL — LOW (ref 11.5–15.5)
MCHC RBC-ENTMCNC: 24.7 PG — LOW (ref 27–34)
MCHC RBC-ENTMCNC: 31.3 GM/DL — LOW (ref 32–36)
MCV RBC AUTO: 79.1 FL — LOW (ref 80–100)
NRBC # BLD: 0 /100 WBCS — SIGNIFICANT CHANGE UP (ref 0–0)
NRBC # FLD: 0 K/UL — SIGNIFICANT CHANGE UP (ref 0–0)
PCP SPEC-MCNC: SIGNIFICANT CHANGE UP
PLATELET # BLD AUTO: 198 K/UL — SIGNIFICANT CHANGE UP (ref 150–400)
RBC # BLD: 3.64 M/UL — LOW (ref 3.8–5.2)
RBC # FLD: 14.1 % — SIGNIFICANT CHANGE UP (ref 10.3–14.5)
WBC # BLD: 13.65 K/UL — HIGH (ref 3.8–10.5)
WBC # FLD AUTO: 13.65 K/UL — HIGH (ref 3.8–10.5)

## 2023-01-12 RX ORDER — FERROUS SULFATE 325(65) MG
325 TABLET ORAL THREE TIMES A DAY
Refills: 0 | Status: DISCONTINUED | OUTPATIENT
Start: 2023-01-12 | End: 2023-01-13

## 2023-01-12 RX ORDER — ASCORBIC ACID 60 MG
1 TABLET,CHEWABLE ORAL
Qty: 0 | Refills: 0 | DISCHARGE
Start: 2023-01-12

## 2023-01-12 RX ORDER — LIDOCAINE HCL 20 MG/ML
10 VIAL (ML) INJECTION ONCE
Refills: 0 | Status: COMPLETED | OUTPATIENT
Start: 2023-01-12 | End: 2023-01-13

## 2023-01-12 RX ORDER — ETONOGESTREL 68 MG/1
68 IMPLANT SUBCUTANEOUS ONCE
Refills: 0 | Status: COMPLETED | OUTPATIENT
Start: 2023-01-12 | End: 2023-01-13

## 2023-01-12 RX ORDER — ASCORBIC ACID 60 MG
500 TABLET,CHEWABLE ORAL THREE TIMES A DAY
Refills: 0 | Status: DISCONTINUED | OUTPATIENT
Start: 2023-01-12 | End: 2023-01-13

## 2023-01-12 RX ORDER — ACETAMINOPHEN 500 MG
3 TABLET ORAL
Qty: 0 | Refills: 0 | DISCHARGE
Start: 2023-01-12

## 2023-01-12 RX ORDER — FERROUS SULFATE 325(65) MG
1 TABLET ORAL
Qty: 0 | Refills: 0 | DISCHARGE
Start: 2023-01-12

## 2023-01-12 RX ORDER — IBUPROFEN 200 MG
1 TABLET ORAL
Qty: 0 | Refills: 0 | DISCHARGE
Start: 2023-01-12

## 2023-01-12 RX ADMIN — Medication 600 MILLIGRAM(S): at 17:40

## 2023-01-12 RX ADMIN — Medication 975 MILLIGRAM(S): at 04:13

## 2023-01-12 RX ADMIN — Medication 975 MILLIGRAM(S): at 04:43

## 2023-01-12 RX ADMIN — Medication 325 MILLIGRAM(S): at 17:32

## 2023-01-12 RX ADMIN — Medication 1 TABLET(S): at 12:34

## 2023-01-12 RX ADMIN — SODIUM CHLORIDE 3 MILLILITER(S): 9 INJECTION INTRAMUSCULAR; INTRAVENOUS; SUBCUTANEOUS at 05:43

## 2023-01-12 RX ADMIN — Medication 500 MILLIGRAM(S): at 12:34

## 2023-01-12 RX ADMIN — SODIUM CHLORIDE 3 MILLILITER(S): 9 INJECTION INTRAMUSCULAR; INTRAVENOUS; SUBCUTANEOUS at 14:35

## 2023-01-12 RX ADMIN — Medication 500 MILLIGRAM(S): at 17:32

## 2023-01-12 RX ADMIN — Medication 600 MILLIGRAM(S): at 16:40

## 2023-01-12 RX ADMIN — Medication 325 MILLIGRAM(S): at 12:35

## 2023-01-12 NOTE — DISCHARGE NOTE OB - PROVIDER TOKENS
FREE:[LAST:[[facility]],PHONE:[(   )    -],FAX:[(   )    -],ADDRESS:[[facility]  Ambulatory Care Unit  Oncology Building, Level C  [address] [place] [address]  [phone]],FOLLOWUP:[1 month]]

## 2023-01-12 NOTE — PROGRESS NOTE ADULT - SUBJECTIVE AND OBJECTIVE BOX
INCOMPLETE NOTE    R1 Progress Note    Patient seen and examined at bedside, no acute overnight events. No acute complaints, pain well controlled. Patient is ambulating, voiding spontaneously, passing gas, and tolerating regular diet. Denies CP, SOB, N/V, HA, blurred vision, epigastric pain.    Vital Signs Last 24 Hours  T(C): 36.8 (01-11-23 @ 19:00), Max: 36.9 (01-11-23 @ 11:20)  HR: 87 (01-11-23 @ 21:51) (59 - 115)  BP: 104/61 (01-11-23 @ 21:51) (88/50 - 136/87)  RR: 18 (01-11-23 @ 21:51) (18 - 20)  SpO2: 100% (01-11-23 @ 21:51) (80% - 100%)    Physical Exam:  General: NAD  Abdomen: Soft, appropriately tender, non-distended, fundus firm  Pelvic: Lochia wnl    Labs:    Blood Type: O Positive  Antibody Screen: Negative  RPR: Negative               10.5   11.80 )-----------( 230      ( 01-11 @ 06:14 )             33.2         MEDICATIONS  (STANDING):  acetaminophen     Tablet .. 975 milliGRAM(s) Oral <User Schedule>  diphtheria/tetanus/pertussis (acellular) Vaccine (Adacel) 0.5 milliLiter(s) IntraMuscular once  ibuprofen  Tablet. 600 milliGRAM(s) Oral every 6 hours  influenza   Vaccine 0.5 milliLiter(s) IntraMuscular once  oxytocin Infusion 41.667 milliUNIT(s)/Min (125 mL/Hr) IV Continuous <Continuous>  prenatal multivitamin 1 Tablet(s) Oral daily  sodium chloride 0.9% lock flush 3 milliLiter(s) IV Push every 8 hours    MEDICATIONS  (PRN):  benzocaine 20%/menthol 0.5% Spray 1 Spray(s) Topical every 6 hours PRN for Perineal discomfort  dibucaine 1% Ointment 1 Application(s) Topical every 6 hours PRN Perineal discomfort  diphenhydrAMINE 25 milliGRAM(s) Oral every 6 hours PRN Pruritus  hydrocortisone 1% Cream 1 Application(s) Topical every 6 hours PRN Moderate Pain (4-6)  lanolin Ointment 1 Application(s) Topical every 6 hours PRN nipple soreness  magnesium hydroxide Suspension 30 milliLiter(s) Oral two times a day PRN Constipation  pramoxine 1%/zinc 5% Cream 1 Application(s) Topical every 4 hours PRN Moderate Pain (4-6)  simethicone 80 milliGRAM(s) Chew every 4 hours PRN Gas  witch hazel Pads 1 Application(s) Topical every 4 hours PRN Perineal discomfort   R1 Progress Note    Serbian  ID# [id number] used for encounter.    Patient seen and examined at bedside, no acute overnight events. No acute complaints, pain well controlled. Patient is ambulating, voiding spontaneously, passing gas, and tolerating regular diet. Denies CP, SOB, N/V, HA, blurred vision, epigastric pain.    Patient states she had no prenatal care with this pregnancy because she felt well and her pregnancy was progressing similarly to her previous pregnancies.  States she was seen in the ED and was told everything looked well with the pregnancy.  Was referred to [facility] clinic, but did not go because she did not know where to go for her appointment.      Vital Signs Last 24 Hours  T(C): 36.8 (01-11-23 @ 19:00), Max: 36.9 (01-11-23 @ 11:20)  HR: 87 (01-11-23 @ 21:51) (59 - 115)  BP: 104/61 (01-11-23 @ 21:51) (88/50 - 136/87)  RR: 18 (01-11-23 @ 21:51) (18 - 20)  SpO2: 100% (01-11-23 @ 21:51) (80% - 100%)    Physical Exam:  General: NAD  Abdomen: Soft, appropriately tender, non-distended, fundus firm  Pelvic: Lochia wnl    Labs:    Blood Type: O Positive  Antibody Screen: Negative  RPR: Negative               10.5   11.80 )-----------( 230      ( 01-11 @ 06:14 )             33.2         MEDICATIONS  (STANDING):  acetaminophen     Tablet .. 975 milliGRAM(s) Oral <User Schedule>  diphtheria/tetanus/pertussis (acellular) Vaccine (Adacel) 0.5 milliLiter(s) IntraMuscular once  ibuprofen  Tablet. 600 milliGRAM(s) Oral every 6 hours  influenza   Vaccine 0.5 milliLiter(s) IntraMuscular once  oxytocin Infusion 41.667 milliUNIT(s)/Min (125 mL/Hr) IV Continuous <Continuous>  prenatal multivitamin 1 Tablet(s) Oral daily  sodium chloride 0.9% lock flush 3 milliLiter(s) IV Push every 8 hours    MEDICATIONS  (PRN):  benzocaine 20%/menthol 0.5% Spray 1 Spray(s) Topical every 6 hours PRN for Perineal discomfort  dibucaine 1% Ointment 1 Application(s) Topical every 6 hours PRN Perineal discomfort  diphenhydrAMINE 25 milliGRAM(s) Oral every 6 hours PRN Pruritus  hydrocortisone 1% Cream 1 Application(s) Topical every 6 hours PRN Moderate Pain (4-6)  lanolin Ointment 1 Application(s) Topical every 6 hours PRN nipple soreness  magnesium hydroxide Suspension 30 milliLiter(s) Oral two times a day PRN Constipation  pramoxine 1%/zinc 5% Cream 1 Application(s) Topical every 4 hours PRN Moderate Pain (4-6)  simethicone 80 milliGRAM(s) Chew every 4 hours PRN Gas  witch hazel Pads 1 Application(s) Topical every 4 hours PRN Perineal discomfort

## 2023-01-12 NOTE — DISCHARGE NOTE OB - CARE PLAN
Principal Discharge DX:	Normal spontaneous vaginal delivery  Assessment and plan of treatment:	After discharge, please stay on pelvic rest for 6 weeks, meaning no sexual intercourse, no tampons and no douching.  No driving for 2 weeks as women can loose a lot of blood during delivery and there is a possibility of being lightheaded/fainting.  No lifting objects heavier than baby for two weeks.  Expect to have vaginal bleeding/spotting for up to six weeks.  The bleeding should get lighter and more white/light brown with time.  For bleeding soaking more than a pad an hour or passing clots greater than the size of your fist, come in to the emergency department.    Follow up in clinic in 6 weeks.   1

## 2023-01-12 NOTE — DISCHARGE NOTE OB - MATERIALS PROVIDED
Vaccinations/[facility] [place] Screening Program/[place]  Immunization Record/Breastfeeding Log/Bottle Feeding Log/Breastfeeding Mother’s Support Group Information/Guide to Postpartum Care/[facility] Hearing Screen Program/Back To Sleep Handout/Shaken Baby Prevention Handout/Breastfeeding Guide and Packet/Birth Certificate Instructions/Discharge Medication Information for Patients and Families Pocket Guide/MMR Vaccination (VIS Pub Date: 2012)/Tdap Vaccination (VIS Pub Date: 2012)

## 2023-01-12 NOTE — DISCHARGE NOTE OB - PATIENT PORTAL LINK FT
You can access the FollowMyHealth Patient Portal offered by [facility] by registering at the following website: [email or web address]. By joining [facility]’s FollowMyHealth portal, you will also be able to view your health information using other applications (apps) compatible with our system.

## 2023-01-12 NOTE — DISCHARGE NOTE OB - COMMUNITY RESOURCE NAME:
Patient instructed to make a follow up appointment at The [facility] Unit at [facility], [phone] for 4-6 weeks from her delivery date. Patient also instructed to make a follow up appointment for the baby at [facility], Division of General Pediatrics, [phone] for 1-2 days after discharge.

## 2023-01-12 NOTE — DISCHARGE NOTE OB - HOSPITAL COURSE
Patient had uncomplicated, nonsurgical vaginal delivery.  Please see delivery note for details.  During postpartum course patient's vitals were stable, vaginal bleeding appropriate, and pain well controlled.  On day of discharge patient was ambulating, her pain controlled with oral medications, had adequate oral intake, and was voiding freely.  Discharge instructions and precautions were given.  Will return to clinic in 6 weeks for postpartum visit.  Postpartum birth control plan is Nexplanon.

## 2023-01-12 NOTE — PROVIDER CONTACT NOTE (OTHER) - BACKGROUND
KEN 1/11 @ 1053. Pt with no prenatal care. 2 previous vaginal deliveries in [facility]. 1vaginal delivery in [place].

## 2023-01-12 NOTE — DISCHARGE NOTE OB - MEDICATION SUMMARY - MEDICATIONS TO TAKE
I will START or STAY ON the medications listed below when I get home from the hospital:    acetaminophen 325 mg oral tablet  -- 3 tab(s) by mouth every 6 hours, As Needed  -- Indication: For Pain    ibuprofen 600 mg oral tablet  -- 1 tab(s) by mouth every 6 hours, As Needed  -- Indication: For Pain    Prenatal Multivitamins with Folic Acid 1 mg oral tablet  -- 1 tab(s) by mouth once a day  -- Indication: For Postpartum care    ferrous sulfate 325 mg (65 mg elemental iron) oral tablet  -- 1 tab(s) by mouth 3 times a day  -- Indication: For Anemia    ascorbic acid 500 mg oral tablet  -- 1 tab(s) by mouth 3 times a day  -- Indication: For Anemia

## 2023-01-12 NOTE — PROGRESS NOTE ADULT - ASSESSMENT
23y/o G_P_ PPD#1 from [facility], .  Patient stable and progressing appropriately postpartum.    #Postpartum  - Continue with po analgesia  - Increase ambulation  - Continue regular diet  - f/u AM CBC    **INCOMPLETE NOTE**  Evon Gorman, PGY1 23y/o PPD#1 from , .  Patient stable and progressing appropriately postpartum.    #Postpartum  - Continue with po analgesia  - Increase ambulation  - Continue regular diet  - AM H/H: 10.5/33.2->9.0/28.8  - Ordered for iron and vitamin C  - Utox negative  - Patient would like to follow up postpartum care in [facility]  - Declines circumcision for male infant  - Counseled on contraception options, patient desires Nexplanon for contraception, consent signed at bedside with Albanian  ID#[id number], Kindra Gorman, PGY1

## 2023-01-12 NOTE — DISCHARGE NOTE OB - CARE PROVIDER_API CALL
[facility],   [facility]  Ambulatory Care Unit  Oncology Building, Level C  [address] [place] [address]  [phone]  Phone: (   )    -  Fax: (   )    -  Follow Up Time: 1 month

## 2023-01-13 VITALS
DIASTOLIC BLOOD PRESSURE: 71 MMHG | HEART RATE: 67 BPM | OXYGEN SATURATION: 100 % | RESPIRATION RATE: 19 BRPM | SYSTOLIC BLOOD PRESSURE: 113 MMHG | TEMPERATURE: 98 F

## 2023-01-13 PROCEDURE — 11981 INSERTION DRUG DLVR IMPLANT: CPT | Mod: GC

## 2023-01-13 RX ADMIN — Medication 500 MILLIGRAM(S): at 09:14

## 2023-01-13 RX ADMIN — Medication 10 MILLILITER(S): at 14:16

## 2023-01-13 RX ADMIN — Medication 325 MILLIGRAM(S): at 09:14

## 2023-01-13 RX ADMIN — Medication 975 MILLIGRAM(S): at 07:57

## 2023-01-13 RX ADMIN — Medication 975 MILLIGRAM(S): at 08:25

## 2023-01-13 RX ADMIN — ETONOGESTREL 68 MILLIGRAM(S): 68 IMPLANT SUBCUTANEOUS at 14:16

## 2023-01-13 NOTE — PROGRESS NOTE ADULT - ATTENDING COMMENTS
Associate Chief of L & D (Late entry)   Op# [id number]      OB Progress Note:  PPD#2    S: 21yo  PPD#2 s/p . Patient denies  any complaints     O:  Vital Signs Last 24 Hrs  T(C): 37.1 (2023 06:00), Max: 37.1 (2023 06:00)  T(F): 98.7 (2023 06:00), Max: 98.7 (2023 06:00)  HR: 67 (2023 06:00) (67 - 86)  BP: 107/67 (2023 06:00) (107/67 - 116/75)  RR: 17 (2023 06:00) (17 - 19)  SpO2: 100% (2023 06:00) (100% - 100%)      MEDICATIONS  (STANDING):  acetaminophen     Tablet .. 975 milliGRAM(s) Oral <User Schedule>  ascorbic acid 500 milliGRAM(s) Oral three times a day  diphtheria/tetanus/pertussis (acellular) Vaccine (Adacel) 0.5 milliLiter(s) IntraMuscular once  etonogestrel Implant 68 milliGRAM(s) SubDermal once  ferrous    sulfate 325 milliGRAM(s) Oral three times a day  ibuprofen  Tablet. 600 milliGRAM(s) Oral every 6 hours  influenza   Vaccine 0.5 milliLiter(s) IntraMuscular once  lidocaine 1% Injectable 10 milliLiter(s) Local Injection once  oxytocin Infusion 41.667 milliUNIT(s)/Min (125 mL/Hr) IV Continuous <Continuous>  prenatal multivitamin 1 Tablet(s) Oral daily  sodium chloride 0.9% lock flush 3 milliLiter(s) IV Push every 8 hours      Labs:  Blood type: O Positive  Rubella IgG: RPR: Negative                          9.0<L>   13.65<H> >-----------< 198    (  @ 07:16 )             28.8<L>                        10.5<L>   11.80<H> >-----------< 230    (  @ 06:14 )             33.2<L>        Physical Exam:  Abdomen: soft, non-tender, non-distended, fundus firm  Vaginal: Lochia scant   Extremities: No erythema/trace edema    A/P: 21yo PPD#2 s/p    - Patient is stable for discharge and will follow up in the PP clinic  - RENU Hernadez M.D., M.B.A., M.S
Associate Chief of L & D (Late entry)   Op# [id number]    I have met this patient for the first time today.  She is Welsh speaking and received no prenatal care.  She presented in early labor and was admitted by Dr Corona philip and delivered by the [facility] Progress Note:  PPD#1    S: 23yo  PPD#1 s/p . Patient denies  any complaints     O:  Vitals:  Vital Signs Last 24 Hrs  T(C): 36.7 (2023 06:00), Max: 36.8 (2023 16:10)  T(F): 98 (2023 06:00), Max: 98.2 (2023 16:10)  HR: 73 (2023 06:00) (73 - 105)  BP: 118/65 (2023 06:00) (88/50 - 130/60)  RR: 19 (2023 06:00) (18 - 19)  SpO2: 100% (2023 06:00) (98% - 100%)        MEDICATIONS  (STANDING):  acetaminophen     Tablet .. 975 milliGRAM(s) Oral <User Schedule>  ascorbic acid 500 milliGRAM(s) Oral three times a day  diphtheria/tetanus/pertussis (acellular) Vaccine (Adacel) 0.5 milliLiter(s) IntraMuscular once  etonogestrel Implant 68 milliGRAM(s) SubDermal once  ferrous    sulfate 325 milliGRAM(s) Oral three times a day  ibuprofen  Tablet. 600 milliGRAM(s) Oral every 6 hours  influenza   Vaccine 0.5 milliLiter(s) IntraMuscular once  lidocaine 1% Injectable 10 milliLiter(s) Local Injection once  oxytocin Infusion 41.667 milliUNIT(s)/Min (125 mL/Hr) IV Continuous <Continuous>  prenatal multivitamin 1 Tablet(s) Oral daily  sodium chloride 0.9% lock flush 3 milliLiter(s) IV Push every 8 hours      Labs:  Blood type: O Positive  Rubella IgG: RPR: Negative                          9.0<L>   13.65<H> >-----------< 198    (  @ 07:16 )             28.8<L>                        10.5<L>   11.80<H> >-----------< 230    (  @ 06:14 )             33.2<L>        Physical Exam:    Abdomen: soft, non-tender, non-distended, fundus firm  Vaginal: Lochia scant   Extremities: No erythema/trace edema    A/P: 23yo PPD#1 s/p    - Pain well controlled, continue current pain regimen  - Increase ambulation, SCDs when not ambulating  - Continue regular diet  - SW    Manju Hernadez M.D., M.B.A., M.S.

## 2023-01-13 NOTE — PROGRESS NOTE ADULT - SUBJECTIVE AND OBJECTIVE BOX
R1 Progress Note    Swedish  ID#[id number] used for encounter    Patient seen and examined at bedside, no acute overnight events. No acute complaints, pain well controlled. Patient is ambulating, voiding spontaneously, passing gas, and tolerating regular diet. Denies CP, SOB, N/V, HA, blurred vision, epigastric pain.    Vital Signs Last 24 Hours  T(C): 37.1 (01-13-23 @ 06:00), Max: 37.1 (01-13-23 @ 06:00)  HR: 67 (01-13-23 @ 06:00) (67 - 86)  BP: 107/67 (01-13-23 @ 06:00) (107/67 - 116/75)  RR: 17 (01-13-23 @ 06:00) (17 - 19)  SpO2: 100% (01-13-23 @ 06:00) (100% - 100%)    Physical Exam:  General: NAD  Abdomen: Soft, appropriately tender, non-distended, fundus firm  Pelvic: Lochia wnl    Labs:    Blood Type: O Positive  Antibody Screen: Negative  RPR: Negative               9.0    13.65 )-----------( 198      ( 01-12 @ 07:16 )             28.8                10.5   11.80 )-----------( 230      ( 01-11 @ 06:14 )             33.2         MEDICATIONS  (STANDING):  acetaminophen     Tablet .. 975 milliGRAM(s) Oral <User Schedule>  ascorbic acid 500 milliGRAM(s) Oral three times a day  diphtheria/tetanus/pertussis (acellular) Vaccine (Adacel) 0.5 milliLiter(s) IntraMuscular once  etonogestrel Implant 68 milliGRAM(s) SubDermal once  ferrous    sulfate 325 milliGRAM(s) Oral three times a day  ibuprofen  Tablet. 600 milliGRAM(s) Oral every 6 hours  influenza   Vaccine 0.5 milliLiter(s) IntraMuscular once  lidocaine 1% Injectable 10 milliLiter(s) Local Injection once  prenatal multivitamin 1 Tablet(s) Oral daily  sodium chloride 0.9% lock flush 3 milliLiter(s) IV Push every 8 hours    MEDICATIONS  (PRN):  benzocaine 20%/menthol 0.5% Spray 1 Spray(s) Topical every 6 hours PRN for Perineal discomfort  dibucaine 1% Ointment 1 Application(s) Topical every 6 hours PRN Perineal discomfort  diphenhydrAMINE 25 milliGRAM(s) Oral every 6 hours PRN Pruritus  hydrocortisone 1% Cream 1 Application(s) Topical every 6 hours PRN Moderate Pain (4-6)  lanolin Ointment 1 Application(s) Topical every 6 hours PRN nipple soreness  magnesium hydroxide Suspension 30 milliLiter(s) Oral two times a day PRN Constipation  pramoxine 1%/zinc 5% Cream 1 Application(s) Topical every 4 hours PRN Moderate Pain (4-6)  simethicone 80 milliGRAM(s) Chew every 4 hours PRN Gas  witch hazel Pads 1 Application(s) Topical every 4 hours PRN Perineal discomfort

## 2023-01-13 NOTE — PROCEDURE NOTE - ADDITIONAL PROCEDURE DETAILS
Austrian  ID# [id number] used for encounter    Patient placed in lateral recumbent position with L arm raised.  Site marked and 1% lidocaine injected to area.  Nexplanon inserted as per usual protocol.  Placement confirmed by palpation by providers and patient.  Pressure held for minimal bleeding.  Covered with bandage and kerlex pressure dressing.  Patient instructed to leave dressing on for 24h.  Patient tolerated the procedure well.  Patient received card with lot number for her records.     Lot: [id number]  Exp: 11/02/2024    Procedure supervised by Dr. Jaime Gorman PGY1

## 2023-01-13 NOTE — PROGRESS NOTE ADULT - ASSESSMENT
23y/o PPD#2 from , .  Patient stable and progressing appropriately postpartum.    #Postpartum  - Continue with po analgesia  - Increase ambulation  - Continue regular diet  - AM H/H: 10.5/33.2->9.0/28.8  - Ordered for iron and vitamin C  - Utox negative  - Patient would like to follow up postpartum care in [facility]  - Declines circumcision for male infant  - For Nexplanon for contraception  - Dispo planning, discharge instructions and precautions reviewed with patient    Evon Vita, PGY1

## 2023-05-18 NOTE — DISCHARGE NOTE OB - CHANGE SANITARY PADS FREQUENTLY.  WASHING AND WIPING SHOULD OCCUR FROM FRONT TO BACK
May 18, 2023    Serena Barrett  [address] [address]         [facility]  [address]  [facility] [address]  Phone: [phone] May 18, 2023     Patient: Serena Barrett   YOB: 1997   Date of Visit: 5/18/2023       To Whom It May Concern:    It is my medical opinion that Serena Barrett may return to work on 06/04/2023 .    If you have any questions or concerns, please don't hesitate to call.    Sincerely,        Fausto Ponce MD     
Statement Selected

## 2023-05-25 NOTE — OB RN PATIENT PROFILE - NS_SOURCEOFINFO_OBGYN_ALL_OB
Patient You can access the FollowMyHealth Patient Portal offered by [facility] by registering at the following website: [email or web address]. By joining [facility]’s FollowMyHealth portal, you will also be able to view your health information using other applications (apps) compatible with our system.

## 2024-10-10 NOTE — OB PROVIDER IHI INDUCTION/AUGMENTATION NOTE - NS_FINALEDD_OBGYN_ALL_OB_DT
Called patient and LVM offering an earlier appointment today at 2:30pm.  Asked her to call clinic back to let us know if she was interested in coming in earlier.   
11-Jan-2023

## 2024-12-20 NOTE — OB PROVIDER TRIAGE NOTE - NSOUTCOME2_OBGYN_ALL_OB
Hi - wanted to clarify that my referral was less for ADHD assessment and more to get an understanding of her severe forgetfulness, which is a major hindrance to delivering her any kind of medical treatment. She says she was told the appointment was cancelled because neuropsych isn't appropriate for ADHD assessments, which I agree with, as I can do those myself. Let me know if I should be looking to refer in a different direction or if it makes sense to get her rescheduled - thanks! Term

## 2025-03-04 NOTE — DISCHARGE NOTE OB - DRINK 8 TO 10 GLASSES OF FLUID EACH DAY
Statement Selected
Eye Foreign Body    WHAT YOU NEED TO KNOW:    You may have pain, sensitivity to light, or blurry vision for a few days.     DISCHARGE INSTRUCTIONS:    Return to the emergency department if:     You suddenly lose your vision.       You have severe eye pain.     Contact your healthcare provider or ophthalmologist if:     You have new or worse eye swelling.       Your symptoms do not get better, even after the foreign body is removed.       You have white or yellow fluid draining from your eye.       You have questions or concerns about your condition or care.     Medicines: You may need any of the following:     Eye drops or eye ointment may be given to prevent an infection and decrease pain.       NSAIDs, such as ibuprofen, help decrease swelling, pain, and fever. NSAIDs can cause stomach bleeding or kidney problems in certain people. If you take blood thinner medicine, always ask your healthcare provider if NSAIDs are safe for you. Always read the medicine label and follow directions.      Prescription pain medicine may be given. Ask your healthcare provider how to take this medicine safely. Some prescription pain medicines contain acetaminophen. Do not take other medicines that contain acetaminophen without talking to your healthcare provider. Too much acetaminophen may cause liver damage. Prescription pain medicine may cause constipation. Ask your healthcare provider how to prevent or treat constipation.       Take your medicine as directed. Contact your healthcare provider if you think your medicine is not helping or if you have side effects. Tell him of her if you are allergic to any medicine. Keep a list of the medicines, vitamins, and herbs you take. Include the amounts, and when and why you take them. Bring the list or the pill bottles to follow-up visits. Carry your medicine list with you in case of an emergency.    Help your eye heal:     Do not rub your eye. This may cause more damage or infection.       Do not wear your contacts lenses until your eye heals. Ask your healthcare provider how long to follow this direction.       Wear sunglasses as directed. Sunglasses help protect the eye and decrease sensitivity to light.     Prevent another EFB:     Wear safety glasses, eye shields, or goggles. These items can prevent eye injury. Make sure the eyewear wraps around the sides of your face. Wear these items while you work with chemicals, metal, wood, or bodily fluids such as blood. Also wear protective eyewear during sports such as racquetball or swimming. Do not use regular eye glasses for eye protection. They will not protect your eyes from foreign bodies or chemicals.       Use contact lenses as directed. Wash your hands before you clean, insert, or remove your contacts. Insert and remove contact lenses correctly. Clean and change your contacts as directed to help prevent eye damage or infection.     Follow up with your healthcare provider or ophthalmologist in 1 to 2 days: Write down your questions so you remember to ask them during your visits.       © Copyright [employer] 2019 All illustrations and images included in CareNotes are the copyrighted property of [employer] or [email or web address].        Corneal Abrasion    The cornea is the clear covering at the front and center of the eye. This very thin tissue is made up of many layers. If a scratch or injury causes the corneal epithelium to come off, it is called a corneal abrasion. Symptoms include eye pain, difficulty keeping eye open, and light sensitivity. Do not drive or operate machinery if your eye is patched.    SEEK MEDICAL CARE IF YOU HAVE THE FOLLOWING SYMPTOMS: discharge from eyes, changes in vision, worsening of symptoms.